# Patient Record
Sex: MALE | URBAN - METROPOLITAN AREA
[De-identification: names, ages, dates, MRNs, and addresses within clinical notes are randomized per-mention and may not be internally consistent; named-entity substitution may affect disease eponyms.]

---

## 2021-09-02 ENCOUNTER — APPOINTMENT (OUTPATIENT)
Dept: PHYSICAL THERAPY | Facility: MEDICAL CENTER | Age: 36
End: 2021-09-02

## 2021-09-02 PROCEDURE — 97530 THERAPEUTIC ACTIVITIES: CPT

## 2024-07-03 ENCOUNTER — HOSPITAL ENCOUNTER (EMERGENCY)
Facility: HOSPITAL | Age: 39
Discharge: HOME | End: 2024-07-03
Attending: EMERGENCY MEDICINE | Admitting: EMERGENCY MEDICINE

## 2024-07-03 VITALS
OXYGEN SATURATION: 96 % | RESPIRATION RATE: 15 BRPM | HEART RATE: 75 BPM | TEMPERATURE: 99 F | SYSTOLIC BLOOD PRESSURE: 126 MMHG | DIASTOLIC BLOOD PRESSURE: 74 MMHG

## 2024-07-03 DIAGNOSIS — G40.909 RECURRENT SEIZURES (HCC): Primary | ICD-10-CM

## 2024-07-03 DIAGNOSIS — R56.9 SEIZURE (HCC): ICD-10-CM

## 2024-07-03 LAB
ANION GAP SERPL CALCULATED.3IONS-SCNC: 10 MMOL/L (ref 4–13)
ATRIAL RATE: 64 BPM
BUN SERPL-MCNC: 8 MG/DL (ref 5–25)
CALCIUM SERPL-MCNC: 10 MG/DL (ref 8.4–10.2)
CHLORIDE SERPL-SCNC: 107 MMOL/L (ref 96–108)
CO2 SERPL-SCNC: 21 MMOL/L (ref 21–32)
CREAT SERPL-MCNC: 1 MG/DL (ref 0.6–1.3)
GFR SERPL CREATININE-BSD FRML MDRD: 95 ML/MIN/1.73SQ M
GLUCOSE SERPL-MCNC: 91 MG/DL (ref 65–140)
P AXIS: 72 DEGREES
POTASSIUM SERPL-SCNC: 3.5 MMOL/L (ref 3.5–5.3)
PR INTERVAL: 128 MS
QRS AXIS: 82 DEGREES
QRSD INTERVAL: 90 MS
QT INTERVAL: 380 MS
QTC INTERVAL: 392 MS
SODIUM SERPL-SCNC: 138 MMOL/L (ref 135–147)
T WAVE AXIS: 56 DEGREES
VENTRICULAR RATE: 64 BPM

## 2024-07-03 PROCEDURE — 93010 ELECTROCARDIOGRAM REPORT: CPT | Performed by: INTERNAL MEDICINE

## 2024-07-03 PROCEDURE — 99285 EMERGENCY DEPT VISIT HI MDM: CPT | Performed by: EMERGENCY MEDICINE

## 2024-07-03 PROCEDURE — 36415 COLL VENOUS BLD VENIPUNCTURE: CPT | Performed by: EMERGENCY MEDICINE

## 2024-07-03 PROCEDURE — 99284 EMERGENCY DEPT VISIT MOD MDM: CPT

## 2024-07-03 PROCEDURE — 80048 BASIC METABOLIC PNL TOTAL CA: CPT | Performed by: EMERGENCY MEDICINE

## 2024-07-03 PROCEDURE — 93005 ELECTROCARDIOGRAM TRACING: CPT

## 2024-07-03 NOTE — ED ATTENDING ATTESTATION
7/3/2024  I, Devon Mercado DO, saw and evaluated the patient. I have discussed the patient with the resident/non-physician practitioner and agree with the resident's/non-physician practitioner's findings, Plan of Care, and MDM as documented in the resident's/non-physician practitioner's note, except where noted. All available labs and Radiology studies were reviewed.  I was present for key portions of any procedure(s) performed by the resident/non-physician practitioner and I was immediately available to provide assistance.       At this point I agree with the current assessment done in the Emergency Department.  I have conducted an independent evaluation of this patient a history and physical is as follows:    Patient is a 38-year-old male with a history of seizure disorder, accompanied by his girlfriend.  The patient says that he has had a known seizure disorder, diagnosed about 15 years ago, initially was prescribed Depakote, which was continued while he was in [facility], then after leaving [facility], he stopped taking Depakote about 8 or 9 years ago, because he did not like the way it made him feel.  He had also been on a ketogenic diet which she did not like as well.  He says last seizure was about 8 or 9 years ago, just prior to stopping Depakote.  He says that he has not had any seizures in the last 8 or 9 years, has not followed up with neurology and does not have a primary care physician, just stopped it on his own.  He says earlier today he was feeling somewhat anxious, somewhat stressed, started to feel worked up, worried he may be have a seizure because it felt like how he had before prior seizures.  He then remembers waking up in the ambulance.  His girlfriend who accompanies him indicates that he seemed to be anxious, she helped laying down in bed and he had about 3 to 4 minutes of tonic-clonic seizure activity which then terminated spontaneously, she noticed that he had may be bit his tongue or tooth  because there was a drop of blood that came out of his mouth.  EMS was called, they arrived and indicated patient was slightly postictal initially and became more awake and alert in transport.  The girlfriend says that the patient now is totally fine and acting appropriately and the patient himself says he feels okay.  Just feels like his left lower molar is slightly sore because he thinks he may have ground during the seizure.  He says prior to this episode he has been feeling fine, no recent colds, flus, illnesses, no headache, no falls or trauma.    General:  Patient is well-appearing  Head:  Atraumatic  Eyes:  Conjunctiva pink, Extraocular muscle intact, PERRL  ENT:  Mucous membranes are moist, left lower molar slightly tender to palpation but not unstable, no dental malocclusion, no signs of tongue biting, no craniofacial instability  Neck:  Supple  Cardiac:  S1-S2, without murmurs  Lungs:  Clear to auscultation bilaterally  Abdomen:  Soft, nontender, normal bowel sounds, no CVA tenderness, no tympany, no rigidity, no guarding  Extremities:  Normal range of motion  Neurologic:  Awake, fluent speech, normal comprehension. AAOx3. Cranial nerves 2-12 are intact, strength is 5/5 in the bilateral upper & lower extremities, no slurred speech, no facial droop, no deficit on finger-to-nose testing, no pronator drift.  Sensation to light touch is equal and symmetric throughout the whole body  Skin:  Pink warm and dry, no rash  Psychiatric:  Alert, pleasant, cooperative          ED Course  ED Course as of 07/03/24 1506   Wed Jul 03, 2024   1502 ECG interpreted by me, sinus rhythm, rate 64, normal intervals, no acute ischemic or infarctive changes, no old available for comparison     Labs Reviewed   BASIC METABOLIC PANEL       Result Value Ref Range Status    Sodium 138  135 - 147 mmol/L Final    Potassium 3.5  3.5 - 5.3 mmol/L Final    Chloride 107  96 - 108 mmol/L Final    CO2 21  21 - 32 mmol/L Final    ANION GAP 10  4  - 13 mmol/L Final    BUN 8  5 - 25 mg/dL Final    Creatinine 1.00  0.60 - 1.30 mg/dL Final    Comment: Standardized to IDMS reference method    Glucose 91  65 - 140 mg/dL Final    Comment: If the patient is fasting, the ADA then defines impaired fasting glucose as > 100 mg/dL and diabetes as > or equal to 123 mg/dL.    Calcium 10.0  8.4 - 10.2 mg/dL Final    eGFR 95  ml/min/1.73sq m Final    Narrative:     National Kidney Disease Foundation guidelines for Chronic Kidney Disease (CKD):     Stage 1 with normal or high GFR (GFR > 90 mL/min/1.73 square meters)    Stage 2 Mild CKD (GFR = 60-89 mL/min/1.73 square meters)    Stage 3A Moderate CKD (GFR = 45-59 mL/min/1.73 square meters)    Stage 3B Moderate CKD (GFR = 30-44 mL/min/1.73 square meters)    Stage 4 Severe CKD (GFR = 15-29 mL/min/1.73 square meters)    Stage 5 End Stage CKD (GFR <15 mL/min/1.73 square meters)  Note: GFR calculation is accurate only with a steady state creatinine     On reassessment there was no change to the above findings and patient remaining asymptomatic.  This point sounds like the patient had a recurrent seizure.  He has no evidence of life-threatening metabolic abnormality.  He does have a known seizure disorder, is back to his baseline and I do not believe that a head CT or neuroimaging is indicated point.  I did advise the patient to not drive or perform other high risk activities.  Patient given a referral for neurology and information about neurology follow-up.  I do not believe it would be appropriate for me to start antibiotics here in the ED given that I am unsure of the best long-term antiseptic, and he does not want to go back onto his Depakote.    Supportive care, importance of follow-up and return precautions were discussed with patient and girlfriend, who expressed understanding.        DIAGNOSIS:  Acute recurrent seizure, medication noncompliance    MEDICAL DECISION MAKING CODING    COLLECTION AND INTERPRETATION OF DATA  I  ordered each unique test  Tests reviewed personally by me:  ECG: See my ED course  Labs: See above    The [facility]  reporting form was completed and faxed to [facility] as required by law.      Critical Care Time  Procedures

## 2024-07-03 NOTE — DISCHARGE INSTRUCTIONS
Mr. Davila,    You were seen at the [place] ED due to a grand mal seizure. You were evaluated with bloodwork, and an EKG both of which came back normal. We recommend that you follow up with a neurologist within the next 2-3 weeks. We also recommend you not drive due to risk of another seizure.     Thank you for trusting us with your care.

## 2024-07-03 NOTE — ED PROVIDER NOTES
"History  Chief Complaint   Patient presents with    Seizure - Prior Hx Of     Was having argument with girlfriend when girlfriend states he had a grand mal seizure that lasted approx 4 mins. Pt was posit ictal and lethargic upon EMS arrival. Pt has seizure history and is not taking his Depakote        Seizure - Prior Hx Of    Mr. Davila is a 38-year-old male with a past medical history of seizures since birth who was prescribed Depakote which he has not been taking for a few years who presents to the ED with a \"grand mal seizure.\"  Patient is accompanied by his girlfriend Kahlil to help corroborate the following story.  Patient says that he was smoking a cigarette and that he smoked it pretty fast and started feeling short of breath.  He felt his seizure coming on, and notified his girlfriend upon which his girlfriend put him on the bed.  Patient then said he blacked out.  Girlfriend then said that he had a seizure on the bed, and he did not hit his head.  This seizure lasted about 4 and half minutes started at the feet and made its way up to the head and then he was stiff after that.  Girlfriend was concerned about the patient and called paramedics, and patient was brought in by paramedics.  Patient members waking up in the ambulance.  Patient says that he has not been taking his Depakote for about 9 years now and his last seizure was about 9 years ago.  Mr. Davila says that the Depakote makes him feel tired and instead he uses marijuana to help prevent his seizures.  Patient reports that his only injury is that he bit down on his tooth and may have cracked it.    None       History reviewed. No pertinent past medical history.    History reviewed. No pertinent surgical history.    History reviewed. No pertinent family history.  I have reviewed and agree with the history as documented.    E-Cigarette/Vaping     E-Cigarette/Vaping Substances    Nicotine No     THC No     CBD No     Flavoring No     Other No     " Unknown No      Social History     Tobacco Use    Smoking status: Every Day     Current packs/day: 1.00     Types: Cigarettes   Substance Use Topics    Alcohol use: Never    Drug use: Never        Review of Systems   Constitutional:  Negative for chills and fever.   HENT:  Negative for rhinorrhea and sore throat.    Eyes:  Negative for pain and visual disturbance.   Respiratory:  Positive for shortness of breath. Negative for cough.    Cardiovascular:  Negative for chest pain and palpitations.   Gastrointestinal:  Negative for abdominal pain, blood in stool, diarrhea and vomiting.   Genitourinary:  Negative for difficulty urinating, dysuria and hematuria.   Musculoskeletal:  Negative for arthralgias and back pain.   Skin:  Negative for color change and rash.   Neurological:  Positive for seizures. Negative for syncope and weakness.   All other systems reviewed and are negative.      Physical Exam  ED Triage Vitals [07/03/24 1401]   Temperature Pulse Respirations Blood Pressure SpO2   99 °F (37.2 °C) 75 15 126/74 96 %      Temp Source Heart Rate Source Patient Position - Orthostatic VS BP Location FiO2 (%)   Oral -- Lying Right arm --      Pain Score       No Pain             Orthostatic Vital Signs  Vitals:    07/03/24 1401   BP: 126/74   Pulse: 75   Patient Position - Orthostatic VS: Lying       Physical Exam  Vitals and nursing note reviewed.   Constitutional:       General: He is not in acute distress.     Appearance: He is well-developed.   HENT:      Head: Normocephalic and atraumatic.      Comments: Left lower molar tender. No instability or cracks noted.  Eyes:      Conjunctiva/sclera: Conjunctivae normal.   Cardiovascular:      Rate and Rhythm: Normal rate and regular rhythm.      Heart sounds: No murmur heard.  Pulmonary:      Effort: Pulmonary effort is normal. No respiratory distress.      Breath sounds: Normal breath sounds.   Abdominal:      Palpations: Abdomen is soft.      Tenderness: There is no  abdominal tenderness.   Musculoskeletal:         General: No swelling.      Cervical back: Neck supple.   Skin:     General: Skin is warm and dry.      Capillary Refill: Capillary refill takes less than 2 seconds.   Neurological:      Mental Status: He is alert.      Cranial Nerves: Cranial nerves 2-12 are intact.      Sensory: Sensation is intact.      Motor: Motor function is intact.      Coordination: Coordination is intact.   Psychiatric:         Mood and Affect: Mood normal.         ED Medications  Medications - No data to display    Diagnostic Studies  Results Reviewed       Procedure Component Value Units Date/Time    Basic metabolic panel [[id number]] Collected: 07/03/24 1447    Lab Status: Final result Specimen: Blood from Arm, Left Updated: 07/03/24 1519     Sodium 138 mmol/L      Potassium 3.5 mmol/L      Chloride 107 mmol/L      CO2 21 mmol/L      ANION GAP 10 mmol/L      BUN 8 mg/dL      Creatinine 1.00 mg/dL      Glucose 91 mg/dL      Calcium 10.0 mg/dL      eGFR 95 ml/min/1.73sq m     Narrative:      National Kidney Disease Foundation guidelines for Chronic Kidney Disease (CKD):     Stage 1 with normal or high GFR (GFR > 90 mL/min/1.73 square meters)    Stage 2 Mild CKD (GFR = 60-89 mL/min/1.73 square meters)    Stage 3A Moderate CKD (GFR = 45-59 mL/min/1.73 square meters)    Stage 3B Moderate CKD (GFR = 30-44 mL/min/1.73 square meters)    Stage 4 Severe CKD (GFR = 15-29 mL/min/1.73 square meters)    Stage 5 End Stage CKD (GFR <15 mL/min/1.73 square meters)  Note: GFR calculation is accurate only with a steady state creatinine                   No orders to display         Procedures  Procedures      ED Course  ED Course as of 07/07/24 0251 Wed Jul 03, 2024   1517 Basic metabolic panel   1520 BMP values within normal limits.                               SBIRT 22yo+      Flowsheet Row Most Recent Value   Initial Alcohol Screen: US AUDIT-C     1. How often do you have a drink containing alcohol? 0  Filed at: 07/03/2024 1402   2. How many drinks containing alcohol do you have on a typical day you are drinking?  0 Filed at: 07/03/2024 1402   3a. Male UNDER 65: How often do you have five or more drinks on one occasion? 0 Filed at: 07/03/2024 1402   3b. FEMALE Any Age, or MALE 65+: How often do you have 4 or more drinks on one occassion? 0 Filed at: 07/03/2024 1402   Audit-C Score 0 Filed at: 07/03/2024 1402   DEXTER: How many times in the past year have you...    Used an illegal drug or used a prescription medication for non-medical reasons? Never Filed at: 07/03/2024 1402                  Medical Decision Making  Amount and/or Complexity of Data Reviewed  Labs: ordered. Decision-making details documented in ED Course.      Grand mal seizure   Giovanni is 38-year-old male with a past medical history of seizure disorder who presents here after grand mal seizure most likely due to not taking any antiseizure medications.  Patient also reports that he has been under a lot of stress, and has not been eating as much as he should.   I did also consider electrolyte abnormalities and ordered a BMP.  Syncope due to short QT was also considered and an EKG was ordered; EKG looked normal.    [place] [employer] reporting form was also completed due to patient having a seizure.      Disposition  Final diagnoses:   Recurrent seizures (HCC)   Seizure (HCC)     Time reflects when diagnosis was documented in both MDM as applicable and the Disposition within this note       Time User Action Codes Description Comment    7/3/2024  3:21 PM Curt Monreal Add [G40.909] Recurrent seizures (HCC)     7/3/2024  3:22 PM Curt Monreal Add [R56.9] Seizure (HCC)           ED Disposition       ED Disposition   Discharge    Condition   Stable    Date/Time   Wed Jul 3, 2024 1521    Comment   Jacquelin Davila discharge to home/self care.                   Follow-up Information       Follow up With Specialties Details Why Contact Info Additional  Information    [facility] Schedule an appointment as soon as possible for a visit   [address]  [address] [facility] [address] [phone] [facility], [address], [address] [place], [place], [address] [phone]            There are no discharge medications for this patient.        PDMP Review       None             ED Provider  Attending physically available and evaluated Jacquelin Davila. I managed the patient along with the ED Attending.    Electronically Signed by           Curt Monreal MD  07/04/24 3158       Curt Monreal MD  07/07/24 7165

## 2024-10-02 ENCOUNTER — TELEPHONE (OUTPATIENT)
Age: 39
End: 2024-10-02

## 2024-10-18 ENCOUNTER — TELEPHONE (OUTPATIENT)
Dept: NEUROLOGY | Facility: CLINIC | Age: 39
End: 2024-10-18

## 2024-11-25 ENCOUNTER — TELEPHONE (OUTPATIENT)
Dept: NEUROLOGY | Facility: CLINIC | Age: 39
End: 2024-11-25

## 2024-11-25 NOTE — TELEPHONE ENCOUNTER
Called tried to speak with pt but he wasn't available to speak and spoke with Kahlil and she confirmed his appt

## 2024-11-30 ENCOUNTER — HOSPITAL ENCOUNTER (EMERGENCY)
Facility: HOSPITAL | Age: 39
Discharge: HOME | End: 2024-12-01
Attending: EMERGENCY MEDICINE

## 2024-11-30 VITALS
DIASTOLIC BLOOD PRESSURE: 68 MMHG | HEART RATE: 74 BPM | TEMPERATURE: 99.1 F | RESPIRATION RATE: 18 BRPM | SYSTOLIC BLOOD PRESSURE: 126 MMHG | OXYGEN SATURATION: 99 %

## 2024-11-30 DIAGNOSIS — F41.9 ANXIETY: Primary | ICD-10-CM

## 2024-11-30 PROCEDURE — 93005 ELECTROCARDIOGRAM TRACING: CPT

## 2024-11-30 PROCEDURE — 99285 EMERGENCY DEPT VISIT HI MDM: CPT | Performed by: EMERGENCY MEDICINE

## 2024-11-30 PROCEDURE — 99284 EMERGENCY DEPT VISIT MOD MDM: CPT

## 2024-11-30 RX ORDER — OLANZAPINE 10 MG/1
10 TABLET, ORALLY DISINTEGRATING ORAL ONCE
Status: DISCONTINUED | OUTPATIENT
Start: 2024-12-01 | End: 2024-12-01

## 2024-12-01 LAB
AMPHETAMINES SERPL QL SCN: NEGATIVE
ANION GAP SERPL CALCULATED.3IONS-SCNC: 11 MMOL/L (ref 4–13)
ATRIAL RATE: 72 BPM
BARBITURATES UR QL: NEGATIVE
BENZODIAZ UR QL: NEGATIVE
BUN SERPL-MCNC: 6 MG/DL (ref 5–25)
CALCIUM SERPL-MCNC: 9.2 MG/DL (ref 8.4–10.2)
CHLORIDE SERPL-SCNC: 104 MMOL/L (ref 96–108)
CO2 SERPL-SCNC: 25 MMOL/L (ref 21–32)
COCAINE UR QL: NEGATIVE
CREAT SERPL-MCNC: 0.97 MG/DL (ref 0.6–1.3)
FENTANYL UR QL SCN: NEGATIVE
GFR SERPL CREATININE-BSD FRML MDRD: 97 ML/MIN/1.73SQ M
GLUCOSE SERPL-MCNC: 146 MG/DL (ref 65–140)
HYDROCODONE UR QL SCN: NEGATIVE
METHADONE UR QL: NEGATIVE
OPIATES UR QL SCN: NEGATIVE
OXYCODONE+OXYMORPHONE UR QL SCN: NEGATIVE
P AXIS: 84 DEGREES
PCP UR QL: NEGATIVE
POTASSIUM SERPL-SCNC: 3.1 MMOL/L (ref 3.5–5.3)
PR INTERVAL: 128 MS
QRS AXIS: 87 DEGREES
QRSD INTERVAL: 90 MS
QT INTERVAL: 366 MS
QTC INTERVAL: 401 MS
SODIUM SERPL-SCNC: 140 MMOL/L (ref 135–147)
T WAVE AXIS: 25 DEGREES
THC UR QL: POSITIVE
VENTRICULAR RATE: 72 BPM

## 2024-12-01 PROCEDURE — 96360 HYDRATION IV INFUSION INIT: CPT

## 2024-12-01 PROCEDURE — 80048 BASIC METABOLIC PNL TOTAL CA: CPT | Performed by: EMERGENCY MEDICINE

## 2024-12-01 PROCEDURE — 96361 HYDRATE IV INFUSION ADD-ON: CPT

## 2024-12-01 PROCEDURE — 36415 COLL VENOUS BLD VENIPUNCTURE: CPT | Performed by: EMERGENCY MEDICINE

## 2024-12-01 PROCEDURE — 80307 DRUG TEST PRSMV CHEM ANLYZR: CPT | Performed by: STUDENT IN AN ORGANIZED HEALTH CARE EDUCATION/TRAINING PROGRAM

## 2024-12-01 PROCEDURE — 93010 ELECTROCARDIOGRAM REPORT: CPT | Performed by: INTERNAL MEDICINE

## 2024-12-01 RX ORDER — POTASSIUM CHLORIDE 1500 MG/1
20 TABLET, EXTENDED RELEASE ORAL ONCE
Status: COMPLETED | OUTPATIENT
Start: 2024-12-01 | End: 2024-12-01

## 2024-12-01 RX ORDER — POTASSIUM CHLORIDE 14.9 MG/ML
20 INJECTION INTRAVENOUS ONCE
Status: DISCONTINUED | OUTPATIENT
Start: 2024-12-01 | End: 2024-12-01 | Stop reason: HOSPADM

## 2024-12-01 RX ORDER — POTASSIUM CHLORIDE 1500 MG/1
40 TABLET, EXTENDED RELEASE ORAL ONCE
Status: COMPLETED | OUTPATIENT
Start: 2024-12-01 | End: 2024-12-01

## 2024-12-01 RX ORDER — LORAZEPAM 1 MG/1
1 TABLET ORAL ONCE
Status: COMPLETED | OUTPATIENT
Start: 2024-12-01 | End: 2024-12-01

## 2024-12-01 RX ADMIN — POTASSIUM CHLORIDE 40 MEQ: 1500 TABLET, EXTENDED RELEASE ORAL at 02:44

## 2024-12-01 RX ADMIN — LORAZEPAM 1 MG: 1 TABLET ORAL at 01:31

## 2024-12-01 RX ADMIN — SODIUM CHLORIDE 1000 ML: 0.9 INJECTION, SOLUTION INTRAVENOUS at 01:31

## 2024-12-01 RX ADMIN — POTASSIUM CHLORIDE 20 MEQ: 1500 TABLET, EXTENDED RELEASE ORAL at 03:01

## 2024-12-01 NOTE — DISCHARGE INSTRUCTIONS
We attached resources for local mental health resources--you may need to get [place] insurance to become eligible for other services  Please return to the ED if you have thoughts of harming yourself or anyone else  You need to establish care with a primary care doc--you need to follow up about your electrolytes--your potassium was low today and you may need a recheck . We wrote you a referral and the clinic should give you a call next week. They may have arrangements they can make without insurance  We also wrote for a  to help you with access and obtaining insurance

## 2024-12-01 NOTE — ED ATTENDING ATTESTATION
"11/30/2024  I, Lynda Espino MD, saw and evaluated the patient. I have discussed the patient with the resident/non-physician practitioner and agree with the resident's/non-physician practitioner's findings, Plan of Care, and MDM as documented in the resident's/non-physician practitioner's note, except where noted. All available labs and Radiology studies were reviewed.  I was present for key portions of any procedure(s) performed by the resident/non-physician practitioner and I was immediately available to provide assistance.       At this point I agree with the current assessment done in the Emergency Department.  I have conducted an independent evaluation of this patient a history and physical is as follows:    Chief Complaint   Patient presents with    Panic Attack     Anxiety and stress. Decreased appetite. No SI. \"Feeling weak\". Hx seizures, no meds, \"I'm doing fine with that\"     39 y.o. male presenting with anxiety and stress. Social stressors. Family stressors. Financial issues.     ED Course     " Deteriorating patient status - Patient was clinically upgraded due to deteriorating patient status. Quality 226: Preventive Care And Screening: Tobacco Use: Screening And Cessation Intervention: Patient screened for tobacco use and is an ex/non-smoker Quality 47: Advance Care Plan: Advance Care Planning discussed and documented in the medical record; patient did not wish or was not able to name a surrogate decision maker or provide an advance care plan. Detail Level: Simple Quality 431: Preventive Care And Screening: Unhealthy Alcohol Use - Screening: Patient not identified as an unhealthy alcohol user when screened for unhealthy alcohol use using a systematic screening method Quality 130: Documentation Of Current Medications In The Medical Record: Current Medications Documented Quality 397: Melanoma: Reporting: The pathology report includes a pT Category and statement on thickness and ulceration for pT1, mitotic rate. Quality 137: Melanoma: Continuity Of Care - Recall System: Patient information entered into a recall system that includes: target date for the next exam specified AND a process to follow up with patients regarding missed or unscheduled appointments Quality 265: Biopsy Follow-Up: Biopsy results reviewed, communicated, tracked, and documented

## 2024-12-01 NOTE — ED PROVIDER NOTES
"  ED Disposition       None          Assessment & Plan   {Hyperlinks  Risk Stratification - NIHSS - HEART SCORE - Fill out sepsis note and make sure you call [id number] if severe or septic shock:[id number]}    Medical Decision Making           Medications - No data to display    ED Risk Strat Scores                           SBIRT 20yo+      Flowsheet Row Most Recent Value   Initial Alcohol Screen: US AUDIT-C     1. How often do you have a drink containing alcohol? 0 Filed at: 11/30/2024 2244   2. How many drinks containing alcohol do you have on a typical day you are drinking?  0 Filed at: 11/30/2024 2244   3a. Male UNDER 65: How often do you have five or more drinks on one occasion? 0 Filed at: 11/30/2024 2244   Audit-C Score 0 Filed at: 11/30/2024 2244   DEXTER: How many times in the past year have you...    Used an illegal drug or used a prescription medication for non-medical reasons? Never Filed at: 11/30/2024 2244                            History of Present Illness   {Hyperlinks  History (Med, Surg, Fam, Social) - Current Medications - Allergies  :[id number]}    Chief Complaint   Patient presents with   • Panic Attack     Anxiety and stress. Decreased appetite. No SI. \"Feeling weak\". Hx seizures, no meds, \"I'm doing fine with that\"       Past Medical History:   Diagnosis Date   • Seizure (HCC)       History reviewed. No pertinent surgical history.   History reviewed. No pertinent family history.   Social History     Tobacco Use   • Smoking status: Every Day     Current packs/day: 1.00     Types: Cigarettes   • Smokeless tobacco: Never   Substance Use Topics   • Alcohol use: Never   • Drug use: Never      E-Cigarette/Vaping      E-Cigarette/Vaping Substances   • Nicotine No    • THC No    • CBD No    • Flavoring No    • Other No    • Unknown No       I have reviewed and agree with the history as documented.     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     Has hx head trauma loren while in halfway,   Greq up in foster care, some parents later told him he ahd hx seizures, wore a helmet, but he isn't sure of any dx, doesn't recall any specific episodes.   Says he has always been an anxious person, feels very pessimistic, has ancious feelings about the futuer  Has been ritalin, wellbutrin, seroquel, risperdal w multiple dx including schizophrenia, bipolar, marijuana overuse disorder.   Did anger management x 5 years when on parole but has not had any regular psychiatric care since.   Spent 10 years incarcerated, in intermediate from age [age], prior to that was in [facility], lots of head strikes.      Panic Attack      Review of Systems        Objective   {Hyperlinks  Historical Vitals - Historical Labs - Chart Review/Microbiology - Last Echo - Code Status  :[id number]}    ED Triage Vitals [11/30/24 2245]   Temperature Pulse Blood Pressure Respirations SpO2 Patient Position - Orthostatic VS   99.1 °F (37.3 °C) 74 126/68 18 99 % --      Temp Source Heart Rate Source BP Location FiO2 (%) Pain Score    Temporal Monitor -- -- No Pain      Vitals      Date and Time Temp Pulse SpO2 Resp BP Pain Score FACES Pain Rating User   11/30/24 2245 99.1 °F (37.3 °C) 74 99 % 18 126/68 No Pain -- JT            Physical Exam    Results Reviewed       None            No orders to display       Procedures    ED Medication and Procedure Management   None     Patient's Medications    No medications on file     No discharge procedures on file.  ED SEPSIS DOCUMENTATION          w multiple dx including schizophrenia, bipolar, marijuana overuse disorder.           Panic Attack  Presenting symptoms: agitation    Associated symptoms: anxiety        Review of Systems   Constitutional: Negative.    HENT: Negative.     Respiratory: Negative.     Cardiovascular:  Positive for palpitations.   Gastrointestinal: Negative.    Genitourinary: Negative.    Musculoskeletal: Negative.    Skin: Negative.    Psychiatric/Behavioral:  Positive for agitation, decreased concentration and sleep disturbance. The patient is nervous/anxious and is hyperactive.            Objective       ED Triage Vitals [11/30/24 2245]   Temperature Pulse Blood Pressure Respirations SpO2 Patient Position - Orthostatic VS   99.1 °F (37.3 °C) 74 126/68 18 99 % --      Temp Source Heart Rate Source BP Location FiO2 (%) Pain Score    Temporal Monitor -- -- No Pain      Vitals      Date and Time Temp Pulse SpO2 Resp BP Pain Score FACES Pain Rating User   11/30/24 2245 99.1 °F (37.3 °C) 74 99 % 18 126/68 No Pain -- JT            Physical Exam  Constitutional:       General: He is not in acute distress.     Appearance: He is normal weight. He is not ill-appearing or toxic-appearing.   HENT:      Head: Normocephalic and atraumatic.      Nose: Nose normal.      Mouth/Throat:      Mouth: Mucous membranes are moist.   Eyes:      Extraocular Movements: Extraocular movements intact.      Conjunctiva/sclera: Conjunctivae normal.      Pupils: Pupils are equal, round, and reactive to light.   Cardiovascular:      Rate and Rhythm: Normal rate and regular rhythm.      Pulses: Normal pulses.      Heart sounds: Normal heart sounds.   Pulmonary:      Effort: Pulmonary effort is normal.      Breath sounds: Normal breath sounds.   Abdominal:      Palpations: Abdomen is soft.   Musculoskeletal:         General: Normal range of motion.      Cervical back: Normal range of motion.   Skin:     General: Skin is warm and dry.      Capillary Refill: Capillary  refill takes less than 2 seconds.   Neurological:      General: No focal deficit present.      Mental Status: He is alert and oriented to person, place, and time.      Cranial Nerves: No cranial nerve deficit.      Sensory: No sensory deficit.      Motor: No weakness.      Coordination: Coordination normal.      Gait: Gait normal.   Psychiatric:         Attention and Perception: Attention and perception normal.         Mood and Affect: Mood is anxious.         Behavior: Behavior is cooperative.         Thought Content: Thought content is not delusional. Thought content does not include homicidal or suicidal plan.         Cognition and Memory: Memory normal.         Judgment: Judgment is impulsive.      Comments: Moderately accelerated speech but not pressured per se         Results Reviewed       Procedure Component Value Units Date/Time    Rapid drug screen, urine [[id number]]  (Abnormal) Collected: 12/01/24 0132    Lab Status: Final result Specimen: Urine, Clean Catch Updated: 12/01/24 0222     Amph/Meth UR Negative     Barbiturate Ur Negative     Benzodiazepine Urine Negative     Cocaine Urine Negative     Methadone Urine Negative     Opiate Urine Negative     PCP Ur Negative     THC Urine Positive     Oxycodone Urine Negative     Fentanyl Urine Negative     HYDROCODONE URINE Negative    Narrative:      Presumptive report. If requested, specimen will be sent to reference lab for confirmation.  FOR MEDICAL PURPOSES ONLY.   IF CONFIRMATION NEEDED PLEASE CONTACT THE LAB WITHIN 5 DAYS.    Drug Screen Cutoff Levels:  AMPHETAMINE/METHAMPHETAMINES  1000 ng/mL  BARBITURATES     200 ng/mL  BENZODIAZEPINES     200 ng/mL  COCAINE      300 ng/mL  METHADONE      300 ng/mL  OPIATES      300 ng/mL  PHENCYCLIDINE     25 ng/mL  THC       50 ng/mL  OXYCODONE      100 ng/mL  FENTANYL      5 ng/mL  HYDROCODONE     300 ng/mL    Basic metabolic panel [[id number]]  (Abnormal) Collected: 12/01/24 0132    Lab Status: Final result Specimen:  Blood from Arm, Right Updated: 12/01/24 0206     Sodium 140 mmol/L      Potassium 3.1 mmol/L      Chloride 104 mmol/L      CO2 25 mmol/L      ANION GAP 11 mmol/L      BUN 6 mg/dL      Creatinine 0.97 mg/dL      Glucose 146 mg/dL      Calcium 9.2 mg/dL      eGFR 97 ml/min/1.73sq m     Narrative:      National Kidney Disease Foundation guidelines for Chronic Kidney Disease (CKD):     Stage 1 with normal or high GFR (GFR > 90 mL/min/1.73 square meters)    Stage 2 Mild CKD (GFR = 60-89 mL/min/1.73 square meters)    Stage 3A Moderate CKD (GFR = 45-59 mL/min/1.73 square meters)    Stage 3B Moderate CKD (GFR = 30-44 mL/min/1.73 square meters)    Stage 4 Severe CKD (GFR = 15-29 mL/min/1.73 square meters)    Stage 5 End Stage CKD (GFR <15 mL/min/1.73 square meters)  Note: GFR calculation is accurate only with a steady state creatinine            No orders to display       Procedures    ED Medication and Procedure Management   None     There are no discharge medications for this patient.      ED SEPSIS DOCUMENTATION   Time reflects when diagnosis was documented in both MDM as applicable and the Disposition within this note       Time User Action Codes Description Comment    12/1/2024  2:51 AM Rosette Wilson Add [F41.9] Anxiety                  Rosette Wilson MD  12/09/24 8199

## 2024-12-01 NOTE — ED ATTENDING ATTESTATION
"11/30/2024  I, Lynda Espino MD, saw and evaluated the patient. I have discussed the patient with the resident/non-physician practitioner and agree with the resident's/non-physician practitioner's findings, Plan of Care, and MDM as documented in the resident's/non-physician practitioner's note, except where noted. All available labs and Radiology studies were reviewed.  I was present for key portions of any procedure(s) performed by the resident/non-physician practitioner and I was immediately available to provide assistance.       At this point I agree with the current assessment done in the Emergency Department.  I have conducted an independent evaluation of this patient a history and physical is as follows:    Chief Complaint   Patient presents with    Panic Attack     Anxiety and stress. Decreased appetite. No SI. \"Feeling weak\". Hx seizures, no meds, \"I'm doing fine with that\"     39 y.o. male presenting with anxiety and stress. Social stressors. Family stressors. Financial issues.     ED Course     " radial pulses  PULMONARY: Breathing comfortably on RA. Breath sounds are equal and clear to auscultation  ABDOMEN: non-distended. BS present, normoactive. Non-tender  MSK: moves all extremities, no deformities, no peripheral edema, no calf asymmetry  NEURO: Awake, alert, and oriented x 3. Face symmetric. Moves all extremities spontaneously. No focal neurologic deficits  SKIN: Warm, appears well-perfused  MENTAL STATUS: Normal affect, low and anxious mood. Does report very poor sleep to no sleep since Thanksgiving.  No SI or HI.  Good eye contact.  Speech is occasionally disjointed but not pressured.  Thought process is logical.    Labs Reviewed   RAPID DRUG SCREEN, URINE - Abnormal       Result Value Ref Range Status    Amph/Meth UR Negative  Negative Final    Barbiturate Ur Negative  Negative Final    Benzodiazepine Urine Negative  Negative Final    Cocaine Urine Negative  Negative Final    Methadone Urine Negative  Negative Final    Opiate Urine Negative  Negative Final    PCP Ur Negative  Negative Final    THC Urine Positive (*) Negative Final    Oxycodone Urine Negative  Negative Final    Fentanyl Urine Negative  Negative Final    HYDROCODONE URINE Negative  Negative Final    Narrative:     Presumptive report. If requested, specimen will be sent to reference lab for confirmation.  FOR MEDICAL PURPOSES ONLY.   IF CONFIRMATION NEEDED PLEASE CONTACT THE LAB WITHIN 5 DAYS.    Drug Screen Cutoff Levels:  AMPHETAMINE/METHAMPHETAMINES  1000 ng/mL  BARBITURATES     200 ng/mL  BENZODIAZEPINES     200 ng/mL  COCAINE      300 ng/mL  METHADONE      300 ng/mL  OPIATES      300 ng/mL  PHENCYCLIDINE     25 ng/mL  THC       50 ng/mL  OXYCODONE      100 ng/mL  FENTANYL      5 ng/mL  HYDROCODONE     300 ng/mL   BASIC METABOLIC PANEL - Abnormal    Sodium 140  135 - 147 mmol/L Final    Potassium 3.1 (*) 3.5 - 5.3 mmol/L Final    Chloride 104  96 - 108 mmol/L Final    CO2 25  21 - 32 mmol/L Final    ANION GAP 11  4 - 13 mmol/L Final     BUN 6  5 - 25 mg/dL Final    Creatinine 0.97  0.60 - 1.30 mg/dL Final    Comment: Standardized to IDMS reference method    Glucose 146 (*) 65 - 140 mg/dL Final    Comment: If the patient is fasting, the ADA then defines impaired fasting glucose as > 100 mg/dL and diabetes as > or equal to 123 mg/dL.    Calcium 9.2  8.4 - 10.2 mg/dL Final    eGFR 97  ml/min/1.73sq m Final    Narrative:     National Kidney Disease Foundation guidelines for Chronic Kidney Disease (CKD):     Stage 1 with normal or high GFR (GFR > 90 mL/min/1.73 square meters)    Stage 2 Mild CKD (GFR = 60-89 mL/min/1.73 square meters)    Stage 3A Moderate CKD (GFR = 45-59 mL/min/1.73 square meters)    Stage 3B Moderate CKD (GFR = 30-44 mL/min/1.73 square meters)    Stage 4 Severe CKD (GFR = 15-29 mL/min/1.73 square meters)    Stage 5 End Stage CKD (GFR <15 mL/min/1.73 square meters)  Note: GFR calculation is accurate only with a steady state creatinine     No orders to display     ECG 12 Lead Documentation Only    Date/Time: 11/30/2024 11:30 PM    Performed by: Lynda Espino MD  Authorized by: Lynda Espino MD    Comments:      Normal sinus rhythm, ventricular rate 72, KY interval 128, QRS 90, QTc 401, normal axis, no ST/T wave changes to suggest ischemia, no STEMI.  Q waves in inferior leads possibly suggestive of age indeterminant inferior infarct, no significant change in prior EKG dated 7/3/2024.        ED Course     Medications   sodium chloride 0.9 % bolus 1,000 mL (0 mL Intravenous Stopped 12/1/24 0304)   LORazepam (ATIVAN) tablet 1 mg (1 mg Oral Given 12/1/24 0131)   potassium chloride (Klor-Con M20) CR tablet 40 mEq (40 mEq Oral Given 12/1/24 0244)   potassium chloride (Klor-Con M20) CR tablet 20 mEq (20 mEq Oral Given 12/1/24 0301)     39-year-old male presenting with worsening low moods, anxiety, increased stressors, as well as in setting of recent GI illness.  Vital signs reviewed, afebrile and within normal limits.  Differential  diagnosis includes adjustment disorder, depression, with some consideration of bipolar/manic episode due to lack of sleep for the past 2 days, however, patient has good insight and does not have rapid pressured speech to suggest a manic episode at present.  Concerning GI illness, there have been other family members with similar symptoms.  Suspect viral gastroenteritis that is currently present in our community.  Will obtain EKG and basic labs.  Basic labs reveal hypokalemia.  IV fluids administered for hydration.  In discussion with the patient, a dose of Ativan administered for anxiety.  Patient is not currently interested in signing himself into the hospital for inpatient treatment of his depression and anxiety, I feel that he is safe for outpatient follow-up.  Patient provided with outpatient resources. Patient discharged to home with recommendations for symptom control, return precautions, and plan for follow up.

## 2025-01-28 ENCOUNTER — HOSPITAL ENCOUNTER (EMERGENCY)
Facility: HOSPITAL | Age: 40
Discharge: HOME | End: 2025-01-28
Attending: EMERGENCY MEDICINE

## 2025-01-28 VITALS
RESPIRATION RATE: 18 BRPM | DIASTOLIC BLOOD PRESSURE: 81 MMHG | SYSTOLIC BLOOD PRESSURE: 137 MMHG | TEMPERATURE: 97.2 F | HEART RATE: 96 BPM | OXYGEN SATURATION: 97 %

## 2025-01-28 DIAGNOSIS — K08.89 PAIN, DENTAL: Primary | ICD-10-CM

## 2025-01-28 PROCEDURE — 99284 EMERGENCY DEPT VISIT MOD MDM: CPT | Performed by: EMERGENCY MEDICINE

## 2025-01-28 PROCEDURE — 96372 THER/PROPH/DIAG INJ SC/IM: CPT

## 2025-01-28 PROCEDURE — 99282 EMERGENCY DEPT VISIT SF MDM: CPT

## 2025-01-28 RX ORDER — KETOROLAC TROMETHAMINE 30 MG/ML
15 INJECTION, SOLUTION INTRAMUSCULAR; INTRAVENOUS ONCE
Status: COMPLETED | OUTPATIENT
Start: 2025-01-28 | End: 2025-01-28

## 2025-01-28 RX ORDER — ACETAMINOPHEN 325 MG/1
975 TABLET ORAL ONCE
Status: COMPLETED | OUTPATIENT
Start: 2025-01-28 | End: 2025-01-28

## 2025-01-28 RX ORDER — CHLORHEXIDINE GLUCONATE ORAL RINSE 1.2 MG/ML
15 SOLUTION DENTAL 2 TIMES DAILY
Qty: 120 ML | Refills: 0 | Status: SHIPPED | OUTPATIENT
Start: 2025-01-28

## 2025-01-28 RX ORDER — CHLORHEXIDINE GLUCONATE ORAL RINSE 1.2 MG/ML
15 SOLUTION DENTAL EVERY 12 HOURS SCHEDULED
Status: DISCONTINUED | OUTPATIENT
Start: 2025-01-28 | End: 2025-01-28 | Stop reason: HOSPADM

## 2025-01-28 RX ADMIN — ACETAMINOPHEN 975 MG: 325 TABLET, FILM COATED ORAL at 08:32

## 2025-01-28 RX ADMIN — AMOXICILLIN AND CLAVULANATE POTASSIUM 1 TABLET: 875; 125 TABLET, COATED ORAL at 08:33

## 2025-01-28 RX ADMIN — CHLORHEXIDINE GLUCONATE 15 ML: 1.2 SOLUTION ORAL at 08:33

## 2025-01-28 RX ADMIN — KETOROLAC TROMETHAMINE 15 MG: 30 INJECTION, SOLUTION INTRAMUSCULAR; INTRAVENOUS at 08:33

## 2025-01-28 NOTE — DISCHARGE INSTRUCTIONS
You were evaluated in the Emergency Department today for dental pain.    Can take motrin and tylenol together every 6 hours for pain control. Take your medications as prescribed.     Please schedule an appointment with your dentist within the next 2-3 days.    Return to the Emergency Department if you experience worsening or uncontrolled pain, fevers 100.4°F or greater, recurrent vomiting, inability to tolerate food or fluids by mouth, bloody stools or vomit, black or tarry stools, or any other concerning symptoms.    Thank you for choosing us for your care.

## 2025-01-28 NOTE — ED ATTENDING ATTESTATION
1/28/2025  I, Moises Zavaleta MD, saw and evaluated the patient. I have discussed the patient with the resident/non-physician practitioner and agree with the resident's/non-physician practitioner's findings, Plan of Care, and MDM as documented in the resident's/non-physician practitioner's note, except where noted. All available labs and Radiology studies were reviewed.  I was present for key portions of any procedure(s) performed by the resident/non-physician practitioner and I was immediately available to provide assistance.       At this point I agree with the current assessment done in the Emergency Department.  I have conducted an independent evaluation of this patient a history and physical is as follows:    39-year-old man presenting with left upper dental pain.  No fever.  No facial swelling.  Neck pain or swelling.  No difficulty swallowing, speaking or breathing.  On exam patient awake and alert in no acute distress.  Dentition is poor.  There is erosion and fracture of tooth 15.  There is no discernible.  Clear.  Dental abscess.  No trismus or tongue elevation.  Tolerating secretions.  No facial erythema or swelling.  Neck supple with no adenopathy.  Will treat pain, start Peridex and Augmentin, patient was instructed he needs to follow-up with his dentist and states he is going to do this.    ED Course         Critical Care Time  Procedures